# Patient Record
Sex: MALE | Race: OTHER | Employment: UNEMPLOYED | URBAN - METROPOLITAN AREA
[De-identification: names, ages, dates, MRNs, and addresses within clinical notes are randomized per-mention and may not be internally consistent; named-entity substitution may affect disease eponyms.]

---

## 2020-06-16 ENCOUNTER — HOSPITAL ENCOUNTER (EMERGENCY)
Age: 25
Discharge: HOME | End: 2020-06-16
Attending: EMERGENCY MEDICINE

## 2020-06-16 VITALS
SYSTOLIC BLOOD PRESSURE: 138 MMHG | RESPIRATION RATE: 18 BRPM | OXYGEN SATURATION: 99 % | DIASTOLIC BLOOD PRESSURE: 88 MMHG | BODY MASS INDEX: 24.36 KG/M2 | TEMPERATURE: 98 F | HEIGHT: 76 IN | WEIGHT: 200 LBS | HEART RATE: 74 BPM

## 2020-06-16 DIAGNOSIS — K02.9 TOOTH DECAY: ICD-10-CM

## 2020-06-16 DIAGNOSIS — K08.89 DENTALGIA: Primary | ICD-10-CM

## 2020-06-16 PROCEDURE — 99283 EMERGENCY DEPT VISIT LOW MDM: CPT

## 2020-06-16 RX ORDER — HYDROCODONE BITARTRATE AND ACETAMINOPHEN 5; 325 MG/1; MG/1
1-2 TABLET ORAL EVERY 4 HOURS PRN
Qty: 20 TABLET | Refills: 0 | Status: SHIPPED | OUTPATIENT
Start: 2020-06-16 | End: 2020-08-26

## 2020-06-16 RX ORDER — CLINDAMYCIN HYDROCHLORIDE 300 MG/1
300 CAPSULE ORAL 3 TIMES DAILY
Qty: 30 CAPSULE | Refills: 0 | Status: SHIPPED | OUTPATIENT
Start: 2020-06-16 | End: 2020-06-26

## 2020-06-16 NOTE — ED PROVIDER NOTES
Patient Seen in: THE [facility] In [address] [place] [address]      History   Patient presents with:  Dental Problem    Stated Complaint: tooth pain    HPI    27-year-old male comes to the hospital complaint having difficulty with having pain in his left upp well as be encouraged to follow-up with dentistry for further management.         MDM     As above              Disposition and Plan     Clinical Impression:  Genevieve Roberts  (primary encounter diagnosis)  Tooth decay    Disposition:  Discharge  6/16/2020  3:10

## 2020-08-26 ENCOUNTER — HOSPITAL ENCOUNTER (EMERGENCY)
Age: 25
Discharge: HOME | End: 2020-08-26
Attending: EMERGENCY MEDICINE

## 2020-08-26 VITALS
HEIGHT: 76 IN | RESPIRATION RATE: 18 BRPM | OXYGEN SATURATION: 99 % | SYSTOLIC BLOOD PRESSURE: 118 MMHG | TEMPERATURE: 99 F | HEART RATE: 98 BPM | DIASTOLIC BLOOD PRESSURE: 75 MMHG | BODY MASS INDEX: 24.96 KG/M2 | WEIGHT: 205 LBS

## 2020-08-26 DIAGNOSIS — K08.89 DENTALGIA: Primary | ICD-10-CM

## 2020-08-26 PROCEDURE — 99283 EMERGENCY DEPT VISIT LOW MDM: CPT

## 2020-08-26 RX ORDER — CLINDAMYCIN HYDROCHLORIDE 300 MG/1
300 CAPSULE ORAL 3 TIMES DAILY
Qty: 30 CAPSULE | Refills: 0 | Status: SHIPPED | OUTPATIENT
Start: 2020-08-26 | End: 2020-09-05

## 2020-08-26 RX ORDER — IBUPROFEN 600 MG/1
600 TABLET ORAL EVERY 6 HOURS PRN
COMMUNITY

## 2020-08-26 NOTE — ED PROVIDER NOTES
Patient Seen in: THE [facility] Emergency Department In [place]      History   Patient presents with:  Dental Problem    Stated Complaint: dental pain    HPI    25 yr old M here with c/o left upper molar pain past few days.  He states he came to the ED a few w motion and neck supple. Cardiovascular:      Rate and Rhythm: Normal rate and regular rhythm. Pulses: Normal pulses. Heart sounds: Normal heart sounds.    Pulmonary:      Effort: Pulmonary effort is normal.      Breath sounds: Normal breath soun